# Patient Record
Sex: FEMALE | NOT HISPANIC OR LATINO | Employment: FULL TIME | URBAN - METROPOLITAN AREA
[De-identification: names, ages, dates, MRNs, and addresses within clinical notes are randomized per-mention and may not be internally consistent; named-entity substitution may affect disease eponyms.]

---

## 2017-02-23 ENCOUNTER — OFFICE VISIT (OUTPATIENT)
Dept: URGENT CARE | Facility: PHYSICIAN GROUP | Age: 52
End: 2017-02-23
Payer: COMMERCIAL

## 2017-02-23 VITALS
BODY MASS INDEX: 21.17 KG/M2 | DIASTOLIC BLOOD PRESSURE: 80 MMHG | HEART RATE: 88 BPM | OXYGEN SATURATION: 93 % | TEMPERATURE: 99.3 F | SYSTOLIC BLOOD PRESSURE: 110 MMHG | WEIGHT: 112 LBS | RESPIRATION RATE: 14 BRPM

## 2017-02-23 DIAGNOSIS — R52 BODY ACHES: ICD-10-CM

## 2017-02-23 DIAGNOSIS — R09.81 NASAL CONGESTION: ICD-10-CM

## 2017-02-23 DIAGNOSIS — R05.9 COUGH: ICD-10-CM

## 2017-02-23 DIAGNOSIS — J10.1 INFLUENZA A: ICD-10-CM

## 2017-02-23 LAB
FLUAV+FLUBV AG SPEC QL IA: NORMAL
INT CON NEG: NORMAL
INT CON POS: NORMAL

## 2017-02-23 PROCEDURE — 87804 INFLUENZA ASSAY W/OPTIC: CPT | Performed by: PHYSICIAN ASSISTANT

## 2017-02-23 PROCEDURE — 99204 OFFICE O/P NEW MOD 45 MIN: CPT | Performed by: PHYSICIAN ASSISTANT

## 2017-02-23 RX ORDER — BENZONATATE 100 MG/1
200 CAPSULE ORAL 3 TIMES DAILY PRN
Qty: 30 CAP | Refills: 0 | Status: SHIPPED | OUTPATIENT
Start: 2017-02-23 | End: 2018-12-26

## 2017-02-23 RX ORDER — OSELTAMIVIR PHOSPHATE 75 MG/1
75 CAPSULE ORAL 2 TIMES DAILY
Qty: 10 CAP | Refills: 0 | Status: SHIPPED | OUTPATIENT
Start: 2017-02-23 | End: 2017-02-28

## 2017-02-23 RX ORDER — ACETAMINOPHEN 500 MG
500-1000 TABLET ORAL EVERY 6 HOURS PRN
COMMUNITY
End: 2018-12-26

## 2017-02-23 ASSESSMENT — ENCOUNTER SYMPTOMS
RHINORRHEA: 1
EYE REDNESS: 0
HEADACHES: 1
ABDOMINAL PAIN: 0
EYE DISCHARGE: 0
VOMITING: 0
MYALGIAS: 1
TINGLING: 0
BACK PAIN: 0
COUGH: 1
CHILLS: 1
SORE THROAT: 1
NECK PAIN: 0
SHORTNESS OF BREATH: 0
DIZZINESS: 0
FEVER: 0
WHEEZING: 0

## 2017-02-23 ASSESSMENT — COPD QUESTIONNAIRES: COPD: 0

## 2017-02-23 NOTE — Clinical Note
February 23, 2017         Patient: Pat Romero   YOB: 1965   Date of Visit: 2/23/2017           To Whom it May Concern:    Pat Romero was seen in my clinic on 2/23/2017. Please excuse this patient from work due to recent illness- she may return to work on Monday if symptoms have improved.     If you have any questions or concerns, please don't hesitate to call.        Sincerely,           Duarte Coffey PA-C  Electronically Signed

## 2017-02-23 NOTE — PROGRESS NOTES
Subjective:      Pat Romero is a 51 y.o. female who presents with Cough          Pt is 50 y/o female who presents with cough, myalgias, and fatigue for 2 days. Pt. Notes several other people at work have been ill. She did not get her flu shot this year.   Cough  This is a new problem. Episode onset: 2 days ago. The problem has been gradually worsening. The problem occurs every few minutes. The cough is non-productive. Associated symptoms include chills, ear congestion, ear pain, headaches, myalgias, nasal congestion, postnasal drip, rhinorrhea and a sore throat. Pertinent negatives include no chest pain, eye redness, fever, rash, shortness of breath or wheezing. Associated symptoms comments: Pos. For tactile fevers  . Nothing aggravates the symptoms. Treatments tried: OTC cold formulations. The treatment provided mild relief. There is no history of asthma or COPD.       Review of Systems   Constitutional: Positive for chills and malaise/fatigue. Negative for fever.   HENT: Positive for congestion, ear pain, postnasal drip, rhinorrhea and sore throat. Negative for ear discharge.    Eyes: Negative for discharge and redness.   Respiratory: Positive for cough. Negative for shortness of breath and wheezing.    Cardiovascular: Negative for chest pain and leg swelling.   Gastrointestinal: Negative for vomiting and abdominal pain.   Genitourinary: Negative for dysuria and urgency.   Musculoskeletal: Positive for myalgias. Negative for back pain and neck pain.   Skin: Negative for itching and rash.   Neurological: Positive for headaches. Negative for dizziness and tingling.          Objective:     /80 mmHg  Pulse 88  Temp(Src) 37.4 °C (99.3 °F)  Resp 14  Wt 50.803 kg (112 lb)  SpO2 93%   PMH:  has a past medical history of Palpitations (12/14/2012) and Allergy. She also has no past medical history of AAA (abdominal aortic aneurysm) (CMS-[facility]), Abnormal EKG, Anticoagulation monitoring, special range, Aortic  stenosis, Arrhythmia, Atrial fibrillation (CMS-[facility]), Atrial flutter (CMS-[facility]), Constrictive cardiomyopathy (CMS-[facility]), Addisons disease (CMS-[facility]), Adrenal disorder (CMS-[facility]), Anemia, Blood transfusion, Anxiety, Cancer (CMS-[facility]), CATARACT, COPD, Cushings syndrome (CMS-[facility]), Depression, Diabetes, EMPHYSEMA, Diabetic neuropathy (CMS-[facility]), Glaucoma, Goiter, Headache(784.0), or HIV (human immunodeficiency virus infection).  MEDS:   Current outpatient prescriptions:   •  acetaminophen (TYLENOL) 500 MG Tab, Take 500-1,000 mg by mouth every 6 hours as needed., Disp: , Rfl:   ALLERGIES: No Known Allergies  SURGHX:   Past Surgical History   Procedure Laterality Date   • Tonsillectomy  y-32     SOCHX:  reports that she has never smoked. She has never used smokeless tobacco. She reports that she does not drink alcohol or use illicit drugs.  FH: Family history was reviewed, no pertinent findings to report    Physical Exam   Constitutional: She is oriented to person, place, and time. She appears well-developed and well-nourished.   HENT:   Head: Normocephalic and atraumatic.   Right Ear: External ear normal.   Left Ear: External ear normal.   Mouth/Throat: Oropharynx is clear and moist. No oropharyngeal exudate.   Erythematous nasal turbinates with moderate amount of discharge.    Eyes: EOM are normal. Pupils are equal, round, and reactive to light.   Neck: Normal range of motion. Neck supple.   Cardiovascular: Normal rate and regular rhythm.    No murmur heard.  Pulmonary/Chest: Effort normal and breath sounds normal. No respiratory distress.   Musculoskeletal: Normal range of motion. She exhibits no tenderness.   Lymphadenopathy:     She has no cervical adenopathy.   Neurological: She is alert and oriented to person, place, and time.   Skin: Skin is warm. No rash noted.   Psychiatric: She has a normal mood and affect. Her behavior is normal.   Vitals reviewed.            influenza- positive for A.     Assessment/Plan:     1.  Influenza A- Tamiflu was written- as pt. Is within 48 hour window.     2. Body aches  - POCT Influenza A/B    3.. Nasal congestion  4. Cough- Tessalon was written today for cough. Work note was written today.     Increase fluids, avoid night time dairy. Humidification. Discussed other supportive therapies today.   Patient given precautionary s/sx that mandate immediate follow up and evaluation in the ED. Advised of risks of not doing so.    DDX, Supportive care, and indications for immediate follow-up discussed with patient.    Instructed to return to clinic or nearest emergency department if we are not available for any change in condition, further concerns, or worsening of symptoms.    The patient demonstrated a good understanding and agreed with the treatment plan.

## 2017-02-23 NOTE — MR AVS SNAPSHOT
Pat Romero   2017 12:30 PM   Office Visit   MRN: [id number]    Department:  [facility]   Dept Phone:  [phone]    Description:  Female : 1965   Provider:  Duarte Coffey PA-C           Reason for Visit     Cough cough, HA, body ache since Tuesday      Allergies as of 2017     No Known Allergies      You were diagnosed with     Body aches   [880957]       Nasal congestion   [388469]       Influenza A   [692138]       Cough   [786.2.ICD-9-CM]         Vital Signs     Blood Pressure Pulse Temperature Respirations Weight Oxygen Saturation    110/80 mmHg 88 37.4 °C (99.3 °F) 14 50.803 kg (112 lb) 93%    Smoking Status                   Never Smoker            Basic Information     Date Of Birth Sex Race Ethnicity Preferred Language    1965 Female Unknown Non- English      Problem List              ICD-10-CM Priority Class Noted - Resolved    Palpitations R00.2   2012 - Present      Health Maintenance        Date Due Completion Dates    IMM DTaP/Tdap/Td Vaccine (1 - Tdap) 10/13/1984 ---    PAP SMEAR 10/13/1986 ---    MAMMOGRAM 2015, 2014, 2005    COLONOSCOPY 10/13/2015 ---    IMM INFLUENZA (1) 2016 ---            Results     POCT Influenza A/B      Component    Rapid Influenza A-B    POS A    Internal Control Positive    Valid    Internal Control Negative    Valid                        Current Immunizations     No immunizations on file.      Below and/or attached are the medications your provider expects you to take. Review all of your home medications and newly ordered medications with your provider and/or pharmacist. Follow medication instructions as directed by your provider and/or pharmacist. Please keep your medication list with you and share with your provider. Update the information when medications are discontinued, doses are changed, or new medications (including over-the-counter products) are added; and carry  medication information at all times in the event of emergency situations     Allergies:  No Known Allergies          Medications  Valid as of: February 23, 2017 -  1:27 PM    Generic Name Brand Name Tablet Size Instructions for use    Acetaminophen (Tab) TYLENOL 500 MG Take 500-1,000 mg by mouth every 6 hours as needed.        Benzonatate (Cap) TESSALON 100 MG Take 2 Caps by mouth 3 times a day as needed for Cough.        Oseltamivir Phosphate (Cap) TAMIFLU 75 MG Take 1 Cap by mouth 2 times a day for 5 days.        .                 Medicines prescribed today were sent to:     [facility] [address] [place] [address] [id number] [facility] [address]    Phone: [phone] Fax: [phone]    Open 24 Hours?: No      Medication refill instructions:       If your prescription bottle indicates you have medication refills left, it is not necessary to call your provider’s office. Please contact your pharmacy and they will refill your medication.    If your prescription bottle indicates you do not have any refills left, you may request refills at any time through one of the following ways: The online [employer] system (except Urgent Care), by calling your provider’s office, or by asking your pharmacy to contact your provider’s office with a refill request. Medication refills are processed only during regular business hours and may not be available until the next business day. Your provider may request additional information or to have a follow-up visit with you prior to refilling your medication.   *Please Note: Medication refills are assigned a new Rx number when refilled electronically. Your pharmacy may indicate that no refills were authorized even though a new prescription for the same medication is available at the pharmacy. Please request the medicine by name with the pharmacy before contacting your provider for a refill.           [employer] Access Code: [email or web address]  Expires: 3/25/2017   1:27 PM    Accredible  A secure, online tool to manage your health information     [employer]’s Accredible® is a secure, online tool that connects you to your personalized health information from the privacy of your home -- day or night - making it very easy for you to manage your healthcare. Once the activation process is completed, you can even access your medical information using the Accredible keysha, which is available for free in the Apple Keysha store or Google Play store.     Accredible provides the following levels of access (as shown below):   My Chart Features   [facility] Doctor [facility]  Specialists [facility]  Urgent  Care Non-[facility]  Primary Care  Doctor   Email your healthcare team securely and privately 24/7 X X X    Manage appointments: schedule your next appointment; view details of past/upcoming appointments X      Request prescription refills. X      View recent personal medical records, including lab and immunizations X X X X   View health record, including health history, allergies, medications X X X X   Read reports about your outpatient visits, procedures, consult and ER notes X X X X   See your discharge summary, which is a recap of your hospital and/or ER visit that includes your diagnosis, lab results, and care plan. X X       How to register for Accredible:  1. Go to  [email or web address].  2. Click on the Sign Up Now box, which takes you to the New Member Sign Up page. You will need to provide the following information:  a. Enter your Accredible Access Code exactly as it appears at the top of this page. (You will not need to use this code after you’ve completed the sign-up process. If you do not sign up before the expiration date, you must request a new code.)   b. Enter your date of birth.   c. Enter your home email address.   d. Click Submit, and follow the next screen’s instructions.  3. Create a Accredible ID. This will be your Accredible login ID and cannot be changed, so think of one that is  secure and easy to remember.  4. Create a [employer] password. You can change your password at any time.  5. Enter your Password Reset Question and Answer. This can be used at a later time if you forget your password.   6. Enter your e-mail address. This allows you to receive e-mail notifications when new information is available in [employer].  7. Click Sign Up. You can now view your health information.    For assistance activating your [employer] account, call [phone]

## 2018-12-26 ENCOUNTER — OFFICE VISIT (OUTPATIENT)
Dept: MEDICAL GROUP | Age: 53
End: 2018-12-26
Payer: COMMERCIAL

## 2018-12-26 VITALS
HEART RATE: 86 BPM | DIASTOLIC BLOOD PRESSURE: 70 MMHG | WEIGHT: 106.6 LBS | TEMPERATURE: 98.9 F | SYSTOLIC BLOOD PRESSURE: 106 MMHG | OXYGEN SATURATION: 96 % | HEIGHT: 62 IN | BODY MASS INDEX: 19.62 KG/M2

## 2018-12-26 DIAGNOSIS — Z12.31 VISIT FOR SCREENING MAMMOGRAM: ICD-10-CM

## 2018-12-26 DIAGNOSIS — E04.1 THYROID NODULE: ICD-10-CM

## 2018-12-26 DIAGNOSIS — Z00.00 ROUTINE GENERAL MEDICAL EXAMINATION AT HEALTH CARE FACILITY: ICD-10-CM

## 2018-12-26 DIAGNOSIS — Z12.11 SCREEN FOR COLON CANCER: ICD-10-CM

## 2018-12-26 PROCEDURE — 99386 PREV VISIT NEW AGE 40-64: CPT | Performed by: INTERNAL MEDICINE

## 2018-12-26 RX ORDER — IBUPROFEN 200 MG
200 TABLET ORAL EVERY 6 HOURS PRN
COMMUNITY

## 2018-12-26 NOTE — LETTER
[facility]  oSl Wynn M.D.  [address]  [place] [address]  Fax: [phone]   Authorization for Release/Disclosure of   Protected Health Information   Name: PAT GALARZA : 1965 SSN: [id number]   Address: [address] [place] [address] [place] [address] Phone:    [phone] (home)    I authorize the entity listed below to release/disclose the PHI below to:   [facility]/Sol Wynn M.D. and Sol Wynn M.D.   Provider or Entity Name:  [facility]    Address   City, State, Zip   Phone:      Fax:     Reason for request: continuity of care   Information to be released:    [  ] LAST COLONOSCOPY,  including any PATH REPORT and follow-up  [  ] LAST FIT/COLOGUARD RESULT [  ] LAST DEXA  [  ] LAST MAMMOGRAM  [XXX] LAST PAP  [  ] LAST LABS [  ] RETINA EXAM REPORT  [  ] IMMUNIZATION RECORDS  [XXX] Release all info      [  ] Check here and initial the line next to each item to release ALL health information INCLUDING  _____ Care and treatment for drug and / or alcohol abuse  _____ HIV testing, infection status, or AIDS  _____ Genetic Testing    DATES OF SERVICE OR TIME PERIOD TO BE DISCLOSED: _____________  I understand and acknowledge that:  * This Authorization may be revoked at any time by you in writing, except if your health information has already been used or disclosed.  * Your health information that will be used or disclosed as a result of you signing this authorization could be re-disclosed by the recipient. If this occurs, your re-disclosed health information may no longer be protected by State or Federal laws.  * You may refuse to sign this Authorization. Your refusal will not affect your ability to obtain treatment.  * This Authorization becomes effective upon signing and will  on (date) __________.      If no date is indicated, this Authorization will  one (1) year from the signature date.    Name: Pat Galarza    Signature:   Date:     2018       PLEASE FAX  REQUESTED RECORDS BACK TO: [phone]

## 2018-12-26 NOTE — ASSESSMENT & PLAN NOTE
Patient presented to clinic for establish care and annual physical exam.  She stated that she is taking multivitamins once a day and ibuprofen from over-the-counter as needed when she had muscle ache or headache.  She does not take any prescription medication.  Patient stated that she has hemorrhoids since after delivery her child over 10 years ago.  She had colonoscopy over 10 years ago due to bleeding per rectum and was told that she has hemorrhoids and no polyps or precancerous lesion.  She is due for colonoscopy.  She denies family history of colon cancer.  Patient stated that she still has hemorrhoid bleeding when she has constipation.  She is using over-the-counter fiber supplements for prevention of constipation and she also use over-the-counter Preparation H as needed when she has hemorrhoid bleeding.  She can feed her hemorrhoid coming out sometime.  I discussed with patient to see colorectal surgeon for hemorrhoidectomy, but patient declined to refer to surgeon.  I also discussed with patient to have colon cancer screening with colonoscopy.  She agreed to refer to gastroenterology for colonoscopy and possible banding for small internal hemorrhoid if they can.

## 2018-12-26 NOTE — PROGRESS NOTES
Chief Complaint:     Pat Romero is a 53 y.o. female who presents for annual exam    Pt has GYN provider: no  Last colonoscopy: Has not done yet.  She agreed to refer to gastroenterology.  Bone density test:no  Gardasil:N\A   Last Td: Patient is due for Tdap vaccine but she declined to receive tetanus shot today.  Regular exercise: yes     Routine general medical examination at health care facility  Patient presented to clinic for establish care and annual physical exam.  She stated that she is taking multivitamins once a day and ibuprofen from over-the-counter as needed when she had muscle ache or headache.  She does not take any prescription medication.  Patient stated that she has hemorrhoids since after delivery her child over 10 years ago.  She had colonoscopy over 10 years ago due to bleeding per rectum and was told that she has hemorrhoids and no polyps or precancerous lesion.  She is due for colonoscopy.  She denies family history of colon cancer.  Patient stated that she still has hemorrhoid bleeding when she has constipation.  She is using over-the-counter fiber supplements for prevention of constipation and she also use over-the-counter Preparation H as needed when she has hemorrhoid bleeding.  She can feed her hemorrhoid coming out sometime.  I discussed with patient to see colorectal surgeon for hemorrhoidectomy, but patient declined to refer to surgeon.  I also discussed with patient to have colon cancer screening with colonoscopy.  She agreed to refer to gastroenterology for colonoscopy and possible banding for small internal hemorrhoid if they can.      Thyroid nodule  Patient stated that she has nodule on right thyroid found on ultrasound.  She stated that she had ultrasound thyroid with ultrasound technician came to her work for routine checkup.  Her previous primary care doctor has not ordered ultrasound thyroid for follow-up.  She has normal blood test in November 2018 with normal CBC, CMP,  thyroid function.  She has slightly elevated LDL at 104 on 11/5/18.  Patient did not have any signs or symptoms of hypo-or hyperthyroidism.        She  has a past medical history of Allergy; Palpitations (12/14/2012); and Thyroid nodule (12/26/2018). She also has no past medical history of AAA (abdominal aortic aneurysm) (HCC); Abnormal EKG; Addisons disease (HCC); Adrenal disorder (HCC); Anemia; Anticoagulation monitoring, special range; Anxiety; Aortic stenosis; Arrhythmia; Atrial fibrillation (HCC); Atrial flutter (HCC); Blood transfusion; Cancer (HCC); CATARACT; Constrictive cardiomyopathy (HCC); COPD; Cushings syndrome (HCC); Depression; Diabetes; Diabetic neuropathy (HCC); EMPHYSEMA; Glaucoma; Headache(784.0); or HIV (human immunodeficiency virus infection).  She  has a past surgical history that includes tonsillectomy (y-32).  Current Outpatient Prescriptions   Medication Sig Dispense Refill   • Multiple Vitamins-Minerals (MULTI FOR HER PO) Take  by mouth.     • ibuprofen (MOTRIN) 200 MG Tab Take 200 mg by mouth every 6 hours as needed.       No current facility-administered medications for this visit.      Social History   Substance Use Topics   • Smoking status: Never Smoker   • Smokeless tobacco: Never Used   • Alcohol use No       Review Of Systems  Denies fever, chills, or sweats, unexplained weight changes  Skin: negative for rash, changing moles, abnormal pigmentation, hair or nail changes.  Eyes: negative for visual blurring, double vision, eye pain, floaters and discharge from eyes  Ears/Nose/Throat: negative for tinnitus, vertigo, oral or dental problem, hoarseness, frequent URI's, sinus trouble, persistent sore throat  Respiratory: negative for persistent cough, hemoptysis, dyspnea, wheezing  Cardiovascular: negative for palpitations, tachycardia, irregular heart beat, chest pain or pressure or peripheral edema.  Breast: Denies breast tenderness, mass,  changes in size or contour, or abnormal  "cyclic discomfort.  Gastrointestinal: negative for dysphagia or odynophagia, nausea, heartburn or reflux, abdominal pain, hemorrhoids, constipation or diarrhea, black stool or bloody stool  Genitourinary: negative for nocturia, dysuria, frequency, incontinence, abnormal vaginal discharge, dysparunia or abnormal vaginal bleeding  Musculoskeletal: negative for joint swelling and muscle pain/ soreness  Neurologic: negative for new or changing headaches, new weakness tremor  Psychiatric: negative for mood or sleep disturbance, anxiety, depression, sexual difficulties  Hematologic/Lymphatic/Immunologic: negative for pallor, unusual bruising, swollen glands, HIV risk factors  Endocrine: negative for temperature intolerance, polydipsia, polyuria.       PHYSICAL EXAMINATION:  Blood pressure 106/70, pulse 86, temperature 37.2 °C (98.9 °F), temperature source Temporal, height 1.585 m (5' 2.4\"), weight 48.4 kg (106 lb 9.6 oz), last menstrual period 12/26/2015, SpO2 96 %, not currently breastfeeding.  Body mass index is 19.25 kg/m².  Wt Readings from Last 4 Encounters:   12/26/18 48.4 kg (106 lb 9.6 oz)   02/23/17 50.8 kg (112 lb)   12/14/12 49.9 kg (110 lb)   03/03/10 50.3 kg (111 lb)       Constitutional: Alert, no distress.  Skin: Warm, dry, good turgor, no rashes or suspicious lesions in visible areas.  Eye: pupils equal, round and reactive to light, conjunctivae clear, lids normal.  ENMT: Lips without lesions, good dentition, oropharynx clear. Pinnae skin normal with no lesions. TM pearly gray with normal light reflex.   Neck: supple, no masses. No anterior or supraclavicular adenopathy. No carotid bruits no thyromegaly.  Respiratory: Unlabored respiratory effort, lungs clear to auscultation, no wheezes, no ronchi.  Cardiovascular: Normal S1, S2, no murmur, no peripheral edema.  Abdomen: no CVAT abdomen Soft, non-tender, no masses, no hepatosplenomegaly.  Psych: Alert and oriented x3, normal affect and mood.  Musc/Skel: " 5/5 strength and normal motion UE and LE proximal and distal.        ASSESSMENT/PLAN:  1. Routine general medical examination at health care facility  CBC WITH DIFFERENTIAL    COMP METABOLIC PANEL    Lipid Profile    TSH    FREE THYROXINE    Discussed healthy diet and regular physical exercise.  Counseling for age appropriate immunization and cancer screening.  Patient declined to receive flu vaccine and Tdap vaccine.  Patient agreed to do mammogram.  Mammogram was ordered.     2. Thyroid nodule  US-SOFT TISSUES OF HEAD - NECK    Asymptomatic. Thyroid function test was normal on 11/5/18.  Ordered thyroid ultrasound for follow-up.     3. Visit for screening mammogram  MA-SCREEN MAMMO W/CAD-BILAT    Counseling for breast cancer screening.  Ordered screening mammogram.     4. Screen for colon cancer  REFERRAL TO GASTROENTEROLOGY    Counseling for colon cancer screening.  Patient agreed to referral to gastroenterology for screening colonoscopy and discuss hemorrhoid treatment.       HCM: Patient declined to receive Tdap vaccine and flu vaccine even after discussions of importance of immunization.  Labs ordered  Immunizations per orders  Pt counseled about skin care, diet, supplements, and exercise.  Next office visit for is due in 3 months for Pap smear.

## 2019-04-02 ENCOUNTER — HOSPITAL ENCOUNTER (OUTPATIENT)
Facility: MEDICAL CENTER | Age: 54
End: 2019-04-02
Attending: INTERNAL MEDICINE
Payer: COMMERCIAL

## 2019-04-02 ENCOUNTER — OFFICE VISIT (OUTPATIENT)
Dept: MEDICAL GROUP | Age: 54
End: 2019-04-02
Payer: COMMERCIAL

## 2019-04-02 VITALS
WEIGHT: 105.6 LBS | BODY MASS INDEX: 19.43 KG/M2 | HEART RATE: 89 BPM | HEIGHT: 62 IN | SYSTOLIC BLOOD PRESSURE: 94 MMHG | OXYGEN SATURATION: 97 % | TEMPERATURE: 98.9 F | DIASTOLIC BLOOD PRESSURE: 66 MMHG

## 2019-04-02 DIAGNOSIS — Z01.419 ENCOUNTER FOR GYNECOLOGICAL EXAMINATION: ICD-10-CM

## 2019-04-02 DIAGNOSIS — Z12.4 SCREENING FOR CERVICAL CANCER: ICD-10-CM

## 2019-04-02 PROCEDURE — 99396 PREV VISIT EST AGE 40-64: CPT | Performed by: INTERNAL MEDICINE

## 2019-04-02 PROCEDURE — 87624 HPV HI-RISK TYP POOLED RSLT: CPT

## 2019-04-02 PROCEDURE — 88175 CYTOPATH C/V AUTO FLUID REDO: CPT

## 2019-04-02 ASSESSMENT — PATIENT HEALTH QUESTIONNAIRE - PHQ9: CLINICAL INTERPRETATION OF PHQ2 SCORE: 0

## 2019-04-02 ASSESSMENT — PAIN SCALES - GENERAL: PAINLEVEL: 5=MODERATE PAIN

## 2019-04-03 ENCOUNTER — HOSPITAL ENCOUNTER (OUTPATIENT)
Dept: RADIOLOGY | Facility: MEDICAL CENTER | Age: 54
End: 2019-04-03
Attending: INTERNAL MEDICINE
Payer: COMMERCIAL

## 2019-04-03 DIAGNOSIS — Z12.31 VISIT FOR SCREENING MAMMOGRAM: ICD-10-CM

## 2019-04-03 DIAGNOSIS — Z12.4 SCREENING FOR CERVICAL CANCER: ICD-10-CM

## 2019-04-03 PROCEDURE — 77063 BREAST TOMOSYNTHESIS BI: CPT

## 2019-04-03 NOTE — PROGRESS NOTES
SUBJECTIVE: 53 y.o. female for annual routine gynecologic exam  Chief Complaint   Patient presents with   • Gynecologic Exam     pap       Obstetric History     No data available      Last Pap: 4- 5 years ago  History   Sexual Activity   • Sexual activity: Yes   • Partners: Male     Sexual history: currently sexually active, single partner   H/O Abnormal Pap no  She  reports that she has never smoked. She has never used smokeless tobacco.        Allergies: Patient has no known allergies.     ROS:    Reports none menopause symptoms of hot flashes, night sweats, sleep disruption, mood changes.Denies vaginal dryness.   No significant bloating/fluid retention, pelvic pain, or dyspareunia. No vaginal discharge   No breast tenderness, mass, nipple discharge, changes in size or contour, or abnormal cyclic discomfort.  No urinary tract symptoms, no incontinence.   No abdominal pain, change in bowel habits, black or bloody stools.    No unusual headaches, no visual changes, menstrual migraines   No prolonged cough. No dyspnea or chest pain on exertion.  No depression, labile mood, anxiety, libido changes, insomnia.  No polydipsia, polyuria, temperature intolerance.  No new/concerning skin lesions, concerns.     Exercise: sporadic irregular exercise  Preventive Care: Patient is due for colon cancer screening.  The referral to gastroenterology was placed for screening colonoscopy on 12/26/18.  Patient has not done yet.    Current medicines (including changes today)  Current Outpatient Prescriptions   Medication Sig Dispense Refill   • Multiple Vitamins-Minerals (MULTI FOR HER PO) Take  by mouth.     • ibuprofen (MOTRIN) 200 MG Tab Take 200 mg by mouth every 6 hours as needed.       No current facility-administered medications for this visit.      She  has a past medical history of Allergy; Palpitations (12/14/2012); and Thyroid nodule (12/26/2018). She also has no past medical history of AAA (abdominal aortic aneurysm) (HCC);  "Abnormal EKG; Addisons disease (HCC); Adrenal disorder (HCC); Anemia; Anticoagulation monitoring, special range; Anxiety; Aortic stenosis; Arrhythmia; Atrial fibrillation (HCC); Atrial flutter (HCC); Blood transfusion; Cancer (HCC); CATARACT; Constrictive cardiomyopathy (HCC); COPD; Cushings syndrome (HCC); Depression; Diabetes; Diabetic neuropathy (HCC); EMPHYSEMA; Glaucoma; Headache(784.0); or HIV (human immunodeficiency virus infection).  She  has a past surgical history that includes tonsillectomy (y-32).     Family History:   Family History   Problem Relation Age of Onset   • Hypertension Mother    • Cancer Mother 50        cervical cancer          OBJECTIVE:   BP (!) 94/66 (BP Location: Right arm, Patient Position: Sitting, BP Cuff Size: Adult)   Pulse 89   Temp 37.2 °C (98.9 °F) (Temporal)   Ht 1.585 m (5' 2.4\")   Wt 47.9 kg (105 lb 9.6 oz)   SpO2 97%   BMI 19.07 kg/m²   Body mass index is 19.07 kg/m².    HEAD AND NECK:  Ears normal.  Throat, oral cavity and tongue normal.  Neck supple. No adenopathy or masses in the neck or supraclavicular regions.  No carotid bruits. No thyromegaly. NEURO: Cranial nerves are normal. DTR's normal and symmetric.  CHEST:  Clear, good air entry, no wheezes or rales. HEART:  Regular rate and rhythm.  S1 and S2 normal. No edema or JVD. ABDOMEN:  Soft without tenderness, guarding, mass or organomegaly.  No CVA tenderness or inguinal adenopathy. EXTREMITIES:  Extremities, reflexes and peripheral pulses are normal. SKIN: color normal, vascularity normal, no edema, temperature normal   No rashes or suspicious skin lesions noted.     Breast Exam: Performed with instruction during examination. No axillary lymphadenopathy, no skin changes, no dominant masses. No nipple retraction  Pelvic Exam -  Normal external genitalia with no lesions. Normal vaginal mucosa with thin rugation and scant discharge. Cervix with no visible lesions. No cervical motion tenderness. Uterus is normal " sized with no masses. No adnexal tenderness or enlargement appreciated. Thin Prep Pap is obtained, vaginal swab is not obtained and specimen(s) sent to lab  Rectal: deferred    <ASSESSMENT and PLAN>  1. Encounter for gynecological examination      Counseling for healthy diet, regular physical exercise, kegel's exercise, self breast exam once a month and routine screening mammogram once a year.   2. Screening for cervical cancer  THINPREP PAP WITH HPV    Obtained Pap smear and sent to lab.  Will advise for result.     Patient has external hemorrhoids and reported having itchiness and mild intermittent bleeding if she has constipation.  She does not want to refer to surgeon for hemorrhoidectomy yet.  She would like to try conservative treatment with diet change and over-the-counter Preparation H.  I advised patient to do sitz bath after bowel movement and use Preparation H cream as needed.  She is advised to contact me if she wants to refer to surgeon for hemorrhoidectomy.    Discussed  breast self exam, mammography screening, menopause, adequate intake of calcium and vitamin D, diet and exercise, Kegel's exercises   Follow-up in 1 years for next Gyn exam and 3 years for next Pap.   Next office visit for recheck of chronic medical conditions is due in  1 year.

## 2019-04-04 LAB
CYTOLOGY REG CYTOL: NORMAL
HPV HR 12 DNA CVX QL NAA+PROBE: NEGATIVE
HPV16 DNA SPEC QL NAA+PROBE: NEGATIVE
HPV18 DNA SPEC QL NAA+PROBE: NEGATIVE
SPECIMEN SOURCE: NORMAL

## 2022-11-16 NOTE — ASSESSMENT & PLAN NOTE
Patient stated that she has nodule on right thyroid found on ultrasound.  She stated that she had ultrasound thyroid with ultrasound technician came to her work for routine checkup.  Her previous primary care doctor has not ordered ultrasound thyroid for follow-up.  She has normal blood test in November 2018 with normal CBC, CMP, thyroid function.  She has slightly elevated LDL at 104 on 11/5/18.  Patient did not have any signs or symptoms of hypo-or hyperthyroidism.   Specimens verified per policy.

## 2023-10-23 ENCOUNTER — HOSPITAL ENCOUNTER (OUTPATIENT)
Dept: RADIOLOGY | Facility: MEDICAL CENTER | Age: 58
End: 2023-10-23
Attending: INTERNAL MEDICINE
Payer: COMMERCIAL

## 2023-10-23 DIAGNOSIS — Z12.31 VISIT FOR SCREENING MAMMOGRAM: ICD-10-CM

## 2023-10-23 PROCEDURE — 77063 BREAST TOMOSYNTHESIS BI: CPT
